# Patient Record
Sex: FEMALE | Race: WHITE | NOT HISPANIC OR LATINO
[De-identification: names, ages, dates, MRNs, and addresses within clinical notes are randomized per-mention and may not be internally consistent; named-entity substitution may affect disease eponyms.]

---
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
## 2022-04-25 ENCOUNTER — APPOINTMENT (OUTPATIENT)
Dept: ORTHOPEDIC SURGERY | Facility: CLINIC | Age: 22
End: 2022-04-25
Payer: MEDICAID

## 2022-04-25 VITALS — WEIGHT: 120 LBS | HEIGHT: 62 IN | BODY MASS INDEX: 22.08 KG/M2

## 2022-04-25 DIAGNOSIS — M54.50 LOW BACK PAIN, UNSPECIFIED: ICD-10-CM

## 2022-04-25 PROCEDURE — 72170 X-RAY EXAM OF PELVIS: CPT

## 2022-04-25 PROCEDURE — 72100 X-RAY EXAM L-S SPINE 2/3 VWS: CPT

## 2022-04-25 PROCEDURE — 99204 OFFICE O/P NEW MOD 45 MIN: CPT

## 2022-04-25 RX ORDER — METHYLPREDNISOLONE 4 MG/1
4 TABLET ORAL
Qty: 1 | Refills: 0 | Status: ACTIVE | COMMUNITY
Start: 2022-04-25 | End: 1900-01-01

## 2022-04-25 NOTE — ASSESSMENT
[FreeTextEntry1] : X-Ray Examination of the LUMBAR SPINE 2 views shows: disc space narrowing\par X-Ray Examination of the PELVIS 1 or 2 views shows: there are no fractures, subluxations or dislocations. \par \par - We discussed their diagnosis and treatment options at length.\par - We will cont conservative treatment with a course of PT and anti-inflammatory medication.\par - The patient was provided with a prescription to work in therapy on core strengthening as part of the low back protocol\par - Rx given for Medrol dose pack\par - If they do not make an appropriate improvement with therapy we discussed that we will obtain and MRI at their next visit\par - Follow up as needed in 6 weeks to re-evaluate progress with therapy, discussed any concerning signs and if they present to return to the office sooner.\par

## 2022-04-25 NOTE — HISTORY OF PRESENT ILLNESS
[de-identified] : 22 year old female  ([employer], [employer])  low back pain since 2020 with no recalled ARUNA.  pain located at the left side of low back with associated stiffness.  pain worse with prolonged standing, better at rest  has tried muscle relaxers.\par

## 2022-04-25 NOTE — IMAGING
[de-identified] : Back / Spine:\par Inspection: No erythema and ecchymosis.\par Palpation: b/l lumbar paraspinal tenderness. \par Range of motion:  Near full range of motion but with mild stiffness. \par Strength Testing: motor exam is 5/5 throughout both lower extremities with normal tone\par Neurological testing: light touch is intact throughout both lower extremities\par Straight Leg Raise: neg\par [place] test: neg bilaterally\par \par

## 2022-10-19 ENCOUNTER — APPOINTMENT (OUTPATIENT)
Dept: ORTHOPEDIC SURGERY | Facility: CLINIC | Age: 22
End: 2022-10-19

## 2024-05-16 ENCOUNTER — NON-APPOINTMENT (OUTPATIENT)
Age: 24
End: 2024-05-16

## 2024-09-11 ENCOUNTER — NON-APPOINTMENT (OUTPATIENT)
Age: 24
End: 2024-09-11

## 2024-12-30 ENCOUNTER — NON-APPOINTMENT (OUTPATIENT)
Age: 24
End: 2024-12-30

## 2025-01-01 ENCOUNTER — NON-APPOINTMENT (OUTPATIENT)
Age: 25
End: 2025-01-01

## 2025-04-17 ENCOUNTER — NON-APPOINTMENT (OUTPATIENT)
Age: 25
End: 2025-04-17

## 2025-05-22 ENCOUNTER — NON-APPOINTMENT (OUTPATIENT)
Age: 25
End: 2025-05-22